# Patient Record
Sex: FEMALE | URBAN - METROPOLITAN AREA
[De-identification: names, ages, dates, MRNs, and addresses within clinical notes are randomized per-mention and may not be internally consistent; named-entity substitution may affect disease eponyms.]

---

## 2020-12-11 ENCOUNTER — NEW PATIENT (OUTPATIENT)
Dept: URBAN - METROPOLITAN AREA CLINIC 24 | Facility: CLINIC | Age: 25
End: 2020-12-11
Payer: COMMERCIAL

## 2020-12-11 PROCEDURE — 92002 INTRM OPH EXAM NEW PATIENT: CPT | Performed by: OPTOMETRIST

## 2020-12-11 RX ORDER — BESIFLOXACIN 6 MG/ML
0.6 % SUSPENSION OPHTHALMIC
Qty: 5 | Refills: 1 | Status: INACTIVE
Start: 2020-12-11 | End: 2020-12-17

## 2020-12-11 ASSESSMENT — INTRAOCULAR PRESSURE
OD: 11
OS: 10

## 2020-12-14 ENCOUNTER — FOLLOW UP ESTABLISHED (OUTPATIENT)
Dept: URBAN - METROPOLITAN AREA CLINIC 24 | Facility: CLINIC | Age: 25
End: 2020-12-14
Payer: COMMERCIAL

## 2020-12-14 DIAGNOSIS — H16.001 UNSPECIFIED CORNEAL ULCER, RIGHT EYE: Primary | ICD-10-CM

## 2020-12-14 PROCEDURE — 99213 OFFICE O/P EST LOW 20 MIN: CPT | Performed by: OPTOMETRIST

## 2020-12-14 ASSESSMENT — INTRAOCULAR PRESSURE
OD: 13
OS: 15

## 2022-08-01 ENCOUNTER — OFFICE VISIT (OUTPATIENT)
Dept: URBAN - METROPOLITAN AREA CLINIC 24 | Facility: CLINIC | Age: 27
End: 2022-08-01
Payer: COMMERCIAL

## 2022-08-01 DIAGNOSIS — H16.001 UNSPECIFIED CORNEAL ULCER, RIGHT EYE: Primary | ICD-10-CM

## 2022-08-01 PROCEDURE — 99214 OFFICE O/P EST MOD 30 MIN: CPT | Performed by: OPTOMETRIST

## 2022-08-01 ASSESSMENT — INTRAOCULAR PRESSURE
OD: 14
OS: 14

## 2022-08-01 NOTE — IMPRESSION/PLAN
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
-- 2/2 contact lens wear Plan: Pt reports pain improved since start ciprofloxacin gtts 7/29; however diffuse epithelialopathy ou from cipro -- stop cipro ou
-- start ofloxacin q2h OD
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

## 2022-08-04 ENCOUNTER — OFFICE VISIT (OUTPATIENT)
Dept: URBAN - METROPOLITAN AREA CLINIC 24 | Facility: CLINIC | Age: 27
End: 2022-08-04
Payer: COMMERCIAL

## 2022-08-04 DIAGNOSIS — H16.223 KERATOCONJUNCTIVITIS SICCA, BILATERAL: ICD-10-CM

## 2022-08-04 PROCEDURE — 99213 OFFICE O/P EST LOW 20 MIN: CPT | Performed by: OPTOMETRIST

## 2022-08-04 ASSESSMENT — INTRAOCULAR PRESSURE
OD: 13
OS: 13

## 2022-08-04 NOTE — IMPRESSION/PLAN
Impression: Keratoconjunctivitis sicca, bilateral: B33.648. Plan: Significant. Again, continue frequent npafts. 
Start lotemax tid os (defer od for now)

## 2022-08-04 NOTE — IMPRESSION/PLAN
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
-- 2/2 contact lens wear Plan: Improving:
Continue ofloxacin q3h OD Recommend copious npafts

## 2022-08-08 ENCOUNTER — OFFICE VISIT (OUTPATIENT)
Dept: URBAN - METROPOLITAN AREA CLINIC 24 | Facility: CLINIC | Age: 27
End: 2022-08-08
Payer: COMMERCIAL

## 2022-08-08 DIAGNOSIS — H16.001 UNSPECIFIED CORNEAL ULCER, RIGHT EYE: Primary | ICD-10-CM

## 2022-08-08 PROCEDURE — 99213 OFFICE O/P EST LOW 20 MIN: CPT | Performed by: OPTOMETRIST

## 2022-08-08 ASSESSMENT — INTRAOCULAR PRESSURE
OS: 13
OD: 13

## 2022-08-08 NOTE — IMPRESSION/PLAN
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
-- 2/2 contact lens wear Plan: Resolved. Use ofloxacin and loteprednol tid OD x 5 days, then discontinue Recommend copious npafts Notify clinic any symptoms recur. MUST re-establish care with her O.D. rx'ing CLs and improve CL hygiene.